# Patient Record
Sex: MALE | Race: WHITE | NOT HISPANIC OR LATINO | URBAN - METROPOLITAN AREA
[De-identification: names, ages, dates, MRNs, and addresses within clinical notes are randomized per-mention and may not be internally consistent; named-entity substitution may affect disease eponyms.]

---

## 2021-07-07 ENCOUNTER — OFFICE VISIT (OUTPATIENT)
Dept: RHEUMATOLOGY | Facility: CLINIC | Age: 66
End: 2021-07-07
Payer: COMMERCIAL

## 2021-07-07 VITALS
HEART RATE: 85 BPM | RESPIRATION RATE: 16 BRPM | SYSTOLIC BLOOD PRESSURE: 178 MMHG | OXYGEN SATURATION: 98 % | HEIGHT: 68 IN | BODY MASS INDEX: 24.25 KG/M2 | WEIGHT: 160 LBS | DIASTOLIC BLOOD PRESSURE: 88 MMHG | TEMPERATURE: 98 F

## 2021-07-07 DIAGNOSIS — R06.09 DYSPNEA ON EXERTION: ICD-10-CM

## 2021-07-07 DIAGNOSIS — J84.9 INTERSTITIAL PULMONARY DISEASE, UNSPECIFIED: ICD-10-CM

## 2021-07-07 DIAGNOSIS — Z51.81 MEDICATION MONITORING ENCOUNTER: ICD-10-CM

## 2021-07-07 DIAGNOSIS — J84.9 INTERSTITIAL LUNG DISEASE: ICD-10-CM

## 2021-07-07 DIAGNOSIS — M15.8 OTHER OSTEOARTHRITIS INVOLVING MULTIPLE JOINTS: ICD-10-CM

## 2021-07-07 DIAGNOSIS — Z13.89 SCREENING FOR RHEUMATIC DISORDER: ICD-10-CM

## 2021-07-07 DIAGNOSIS — M51.36 LUMBAR DEGENERATIVE DISC DISEASE: ICD-10-CM

## 2021-07-07 DIAGNOSIS — Z11.1 SCREENING-PULMONARY TB: ICD-10-CM

## 2021-07-07 DIAGNOSIS — M05.79 RHEUMATOID ARTHRITIS INVOLVING MULTIPLE SITES WITH POSITIVE RHEUMATOID FACTOR: Primary | ICD-10-CM

## 2021-07-07 DIAGNOSIS — Z11.59 ENCOUNTER FOR HEPATITIS C SCREENING TEST FOR LOW RISK PATIENT: ICD-10-CM

## 2021-07-07 PROCEDURE — 3288F FALL RISK ASSESSMENT DOCD: CPT | Mod: CPTII,S$GLB,, | Performed by: INTERNAL MEDICINE

## 2021-07-07 PROCEDURE — 3288F PR FALLS RISK ASSESSMENT DOCUMENTED: ICD-10-PCS | Mod: CPTII,S$GLB,, | Performed by: INTERNAL MEDICINE

## 2021-07-07 PROCEDURE — 3008F PR BODY MASS INDEX (BMI) DOCUMENTED: ICD-10-PCS | Mod: CPTII,S$GLB,, | Performed by: INTERNAL MEDICINE

## 2021-07-07 PROCEDURE — 3008F BODY MASS INDEX DOCD: CPT | Mod: CPTII,S$GLB,, | Performed by: INTERNAL MEDICINE

## 2021-07-07 PROCEDURE — 1101F PT FALLS ASSESS-DOCD LE1/YR: CPT | Mod: CPTII,S$GLB,, | Performed by: INTERNAL MEDICINE

## 2021-07-07 PROCEDURE — 99205 OFFICE O/P NEW HI 60 MIN: CPT | Mod: S$GLB,,, | Performed by: INTERNAL MEDICINE

## 2021-07-07 PROCEDURE — 1101F PR PT FALLS ASSESS DOC 0-1 FALLS W/OUT INJ PAST YR: ICD-10-PCS | Mod: CPTII,S$GLB,, | Performed by: INTERNAL MEDICINE

## 2021-07-07 PROCEDURE — 1125F AMNT PAIN NOTED PAIN PRSNT: CPT | Mod: S$GLB,,, | Performed by: INTERNAL MEDICINE

## 2021-07-07 PROCEDURE — 1125F PR PAIN SEVERITY QUANTIFIED, PAIN PRESENT: ICD-10-PCS | Mod: S$GLB,,, | Performed by: INTERNAL MEDICINE

## 2021-07-07 PROCEDURE — 99205 PR OFFICE/OUTPT VISIT, NEW, LEVL V, 60-74 MIN: ICD-10-PCS | Mod: S$GLB,,, | Performed by: INTERNAL MEDICINE

## 2021-07-07 RX ORDER — LABETALOL 200 MG/1
TABLET, FILM COATED ORAL
COMMUNITY

## 2021-07-07 RX ORDER — HYDROXYCHLOROQUINE SULFATE 200 MG/1
200 TABLET, FILM COATED ORAL DAILY
Qty: 30 TABLET | Refills: 1 | Status: SHIPPED | OUTPATIENT
Start: 2021-07-07 | End: 2021-08-12 | Stop reason: SDUPTHER

## 2021-07-07 RX ORDER — MELOXICAM 15 MG/1
15 TABLET ORAL DAILY PRN
Qty: 30 TABLET | Refills: 1 | Status: SHIPPED | OUTPATIENT
Start: 2021-07-07 | End: 2021-08-12 | Stop reason: SDUPTHER

## 2021-07-07 RX ORDER — ALBUTEROL SULFATE 90 UG/1
AEROSOL, METERED RESPIRATORY (INHALATION)
COMMUNITY

## 2021-07-07 RX ORDER — FLUTICASONE PROPIONATE AND SALMETEROL 250; 50 UG/1; UG/1
POWDER RESPIRATORY (INHALATION)
COMMUNITY

## 2021-07-07 RX ORDER — ASPIRIN 81 MG/1
TABLET ORAL
COMMUNITY

## 2021-07-07 RX ORDER — ATORVASTATIN CALCIUM 80 MG/1
TABLET, FILM COATED ORAL
COMMUNITY

## 2021-07-07 RX ORDER — OMEPRAZOLE 10 MG/1
10 CAPSULE, DELAYED RELEASE ORAL DAILY
COMMUNITY

## 2021-08-12 ENCOUNTER — OFFICE VISIT (OUTPATIENT)
Dept: RHEUMATOLOGY | Facility: CLINIC | Age: 66
End: 2021-08-12
Payer: COMMERCIAL

## 2021-08-12 VITALS
SYSTOLIC BLOOD PRESSURE: 230 MMHG | HEART RATE: 72 BPM | HEIGHT: 68 IN | RESPIRATION RATE: 16 BRPM | DIASTOLIC BLOOD PRESSURE: 176 MMHG | TEMPERATURE: 98 F | WEIGHT: 163 LBS | BODY MASS INDEX: 24.71 KG/M2 | OXYGEN SATURATION: 98 %

## 2021-08-12 DIAGNOSIS — M51.36 LUMBAR DEGENERATIVE DISC DISEASE: ICD-10-CM

## 2021-08-12 DIAGNOSIS — Z51.81 MEDICATION MONITORING ENCOUNTER: ICD-10-CM

## 2021-08-12 DIAGNOSIS — M05.79 RHEUMATOID ARTHRITIS INVOLVING MULTIPLE SITES WITH POSITIVE RHEUMATOID FACTOR: Primary | ICD-10-CM

## 2021-08-12 DIAGNOSIS — M15.8 OTHER OSTEOARTHRITIS INVOLVING MULTIPLE JOINTS: ICD-10-CM

## 2021-08-12 DIAGNOSIS — G56.03 BILATERAL CARPAL TUNNEL SYNDROME: ICD-10-CM

## 2021-08-12 DIAGNOSIS — M54.16 LUMBAR RADICULOPATHY: ICD-10-CM

## 2021-08-12 DIAGNOSIS — M47.812 CERVICAL SPONDYLOSIS: ICD-10-CM

## 2021-08-12 DIAGNOSIS — J84.9 INTERSTITIAL LUNG DISEASE: ICD-10-CM

## 2021-08-12 DIAGNOSIS — M54.12 CERVICAL RADICULOPATHY: ICD-10-CM

## 2021-08-12 PROCEDURE — 3008F BODY MASS INDEX DOCD: CPT | Mod: CPTII,S$GLB,, | Performed by: INTERNAL MEDICINE

## 2021-08-12 PROCEDURE — 1159F PR MEDICATION LIST DOCUMENTED IN MEDICAL RECORD: ICD-10-PCS | Mod: CPTII,S$GLB,, | Performed by: INTERNAL MEDICINE

## 2021-08-12 PROCEDURE — 3008F PR BODY MASS INDEX (BMI) DOCUMENTED: ICD-10-PCS | Mod: CPTII,S$GLB,, | Performed by: INTERNAL MEDICINE

## 2021-08-12 PROCEDURE — 3077F SYST BP >= 140 MM HG: CPT | Mod: CPTII,S$GLB,, | Performed by: INTERNAL MEDICINE

## 2021-08-12 PROCEDURE — 99214 OFFICE O/P EST MOD 30 MIN: CPT | Mod: S$GLB,,, | Performed by: INTERNAL MEDICINE

## 2021-08-12 PROCEDURE — 1101F PR PT FALLS ASSESS DOC 0-1 FALLS W/OUT INJ PAST YR: ICD-10-PCS | Mod: CPTII,S$GLB,, | Performed by: INTERNAL MEDICINE

## 2021-08-12 PROCEDURE — 3077F PR MOST RECENT SYSTOLIC BLOOD PRESSURE >= 140 MM HG: ICD-10-PCS | Mod: CPTII,S$GLB,, | Performed by: INTERNAL MEDICINE

## 2021-08-12 PROCEDURE — 1101F PT FALLS ASSESS-DOCD LE1/YR: CPT | Mod: CPTII,S$GLB,, | Performed by: INTERNAL MEDICINE

## 2021-08-12 PROCEDURE — 1125F PR PAIN SEVERITY QUANTIFIED, PAIN PRESENT: ICD-10-PCS | Mod: CPTII,S$GLB,, | Performed by: INTERNAL MEDICINE

## 2021-08-12 PROCEDURE — 1160F PR REVIEW ALL MEDS BY PRESCRIBER/CLIN PHARMACIST DOCUMENTED: ICD-10-PCS | Mod: CPTII,S$GLB,, | Performed by: INTERNAL MEDICINE

## 2021-08-12 PROCEDURE — 99214 PR OFFICE/OUTPT VISIT, EST, LEVL IV, 30-39 MIN: ICD-10-PCS | Mod: S$GLB,,, | Performed by: INTERNAL MEDICINE

## 2021-08-12 PROCEDURE — 1125F AMNT PAIN NOTED PAIN PRSNT: CPT | Mod: CPTII,S$GLB,, | Performed by: INTERNAL MEDICINE

## 2021-08-12 PROCEDURE — 3080F PR MOST RECENT DIASTOLIC BLOOD PRESSURE >= 90 MM HG: ICD-10-PCS | Mod: CPTII,S$GLB,, | Performed by: INTERNAL MEDICINE

## 2021-08-12 PROCEDURE — 3080F DIAST BP >= 90 MM HG: CPT | Mod: CPTII,S$GLB,, | Performed by: INTERNAL MEDICINE

## 2021-08-12 PROCEDURE — 1160F RVW MEDS BY RX/DR IN RCRD: CPT | Mod: CPTII,S$GLB,, | Performed by: INTERNAL MEDICINE

## 2021-08-12 PROCEDURE — 3288F FALL RISK ASSESSMENT DOCD: CPT | Mod: CPTII,S$GLB,, | Performed by: INTERNAL MEDICINE

## 2021-08-12 PROCEDURE — 1159F MED LIST DOCD IN RCRD: CPT | Mod: CPTII,S$GLB,, | Performed by: INTERNAL MEDICINE

## 2021-08-12 PROCEDURE — 3288F PR FALLS RISK ASSESSMENT DOCUMENTED: ICD-10-PCS | Mod: CPTII,S$GLB,, | Performed by: INTERNAL MEDICINE

## 2021-08-12 RX ORDER — MELOXICAM 15 MG/1
15 TABLET ORAL DAILY PRN
Qty: 30 TABLET | Refills: 3 | Status: SHIPPED | OUTPATIENT
Start: 2021-08-12 | End: 2021-11-15 | Stop reason: SDUPTHER

## 2021-08-12 RX ORDER — HYDROXYCHLOROQUINE SULFATE 200 MG/1
200 TABLET, FILM COATED ORAL DAILY
Qty: 30 TABLET | Refills: 3 | Status: SHIPPED | OUTPATIENT
Start: 2021-08-12 | End: 2021-11-15 | Stop reason: SDUPTHER

## 2021-11-15 ENCOUNTER — OFFICE VISIT (OUTPATIENT)
Dept: RHEUMATOLOGY | Facility: CLINIC | Age: 66
End: 2021-11-15
Payer: COMMERCIAL

## 2021-11-15 VITALS
BODY MASS INDEX: 26.22 KG/M2 | HEIGHT: 68 IN | HEART RATE: 83 BPM | OXYGEN SATURATION: 98 % | RESPIRATION RATE: 16 BRPM | SYSTOLIC BLOOD PRESSURE: 187 MMHG | WEIGHT: 173 LBS | DIASTOLIC BLOOD PRESSURE: 86 MMHG

## 2021-11-15 DIAGNOSIS — M05.79 RHEUMATOID ARTHRITIS INVOLVING MULTIPLE SITES WITH POSITIVE RHEUMATOID FACTOR: Primary | ICD-10-CM

## 2021-11-15 DIAGNOSIS — M54.12 CERVICAL RADICULOPATHY: ICD-10-CM

## 2021-11-15 DIAGNOSIS — Z51.81 MEDICATION MONITORING ENCOUNTER: ICD-10-CM

## 2021-11-15 DIAGNOSIS — M54.16 LUMBAR RADICULOPATHY: ICD-10-CM

## 2021-11-15 DIAGNOSIS — M51.36 LUMBAR DEGENERATIVE DISC DISEASE: ICD-10-CM

## 2021-11-15 DIAGNOSIS — J84.9 INTERSTITIAL LUNG DISEASE: ICD-10-CM

## 2021-11-15 DIAGNOSIS — G56.03 BILATERAL CARPAL TUNNEL SYNDROME: ICD-10-CM

## 2021-11-15 DIAGNOSIS — M15.8 OTHER OSTEOARTHRITIS INVOLVING MULTIPLE JOINTS: ICD-10-CM

## 2021-11-15 DIAGNOSIS — M47.812 CERVICAL SPONDYLOSIS: ICD-10-CM

## 2021-11-15 DIAGNOSIS — M60.89 OTHER MYOSITIS OF MULTIPLE SITES: ICD-10-CM

## 2021-11-15 PROCEDURE — 3077F PR MOST RECENT SYSTOLIC BLOOD PRESSURE >= 140 MM HG: ICD-10-PCS | Mod: CPTII,S$GLB,, | Performed by: INTERNAL MEDICINE

## 2021-11-15 PROCEDURE — 1160F RVW MEDS BY RX/DR IN RCRD: CPT | Mod: CPTII,S$GLB,, | Performed by: INTERNAL MEDICINE

## 2021-11-15 PROCEDURE — 3288F FALL RISK ASSESSMENT DOCD: CPT | Mod: CPTII,S$GLB,, | Performed by: INTERNAL MEDICINE

## 2021-11-15 PROCEDURE — 3079F DIAST BP 80-89 MM HG: CPT | Mod: CPTII,S$GLB,, | Performed by: INTERNAL MEDICINE

## 2021-11-15 PROCEDURE — 1159F PR MEDICATION LIST DOCUMENTED IN MEDICAL RECORD: ICD-10-PCS | Mod: CPTII,S$GLB,, | Performed by: INTERNAL MEDICINE

## 2021-11-15 PROCEDURE — 1160F PR REVIEW ALL MEDS BY PRESCRIBER/CLIN PHARMACIST DOCUMENTED: ICD-10-PCS | Mod: CPTII,S$GLB,, | Performed by: INTERNAL MEDICINE

## 2021-11-15 PROCEDURE — 99214 OFFICE O/P EST MOD 30 MIN: CPT | Mod: S$GLB,,, | Performed by: INTERNAL MEDICINE

## 2021-11-15 PROCEDURE — 3288F PR FALLS RISK ASSESSMENT DOCUMENTED: ICD-10-PCS | Mod: CPTII,S$GLB,, | Performed by: INTERNAL MEDICINE

## 2021-11-15 PROCEDURE — 3008F PR BODY MASS INDEX (BMI) DOCUMENTED: ICD-10-PCS | Mod: CPTII,S$GLB,, | Performed by: INTERNAL MEDICINE

## 2021-11-15 PROCEDURE — 3008F BODY MASS INDEX DOCD: CPT | Mod: CPTII,S$GLB,, | Performed by: INTERNAL MEDICINE

## 2021-11-15 PROCEDURE — 99214 PR OFFICE/OUTPT VISIT, EST, LEVL IV, 30-39 MIN: ICD-10-PCS | Mod: S$GLB,,, | Performed by: INTERNAL MEDICINE

## 2021-11-15 PROCEDURE — 3077F SYST BP >= 140 MM HG: CPT | Mod: CPTII,S$GLB,, | Performed by: INTERNAL MEDICINE

## 2021-11-15 PROCEDURE — 3079F PR MOST RECENT DIASTOLIC BLOOD PRESSURE 80-89 MM HG: ICD-10-PCS | Mod: CPTII,S$GLB,, | Performed by: INTERNAL MEDICINE

## 2021-11-15 PROCEDURE — 1101F PT FALLS ASSESS-DOCD LE1/YR: CPT | Mod: CPTII,S$GLB,, | Performed by: INTERNAL MEDICINE

## 2021-11-15 PROCEDURE — 1159F MED LIST DOCD IN RCRD: CPT | Mod: CPTII,S$GLB,, | Performed by: INTERNAL MEDICINE

## 2021-11-15 PROCEDURE — 1101F PR PT FALLS ASSESS DOC 0-1 FALLS W/OUT INJ PAST YR: ICD-10-PCS | Mod: CPTII,S$GLB,, | Performed by: INTERNAL MEDICINE

## 2021-11-15 RX ORDER — HYDROXYCHLOROQUINE SULFATE 200 MG/1
200 TABLET, FILM COATED ORAL DAILY
Qty: 30 TABLET | Refills: 4 | Status: SHIPPED | OUTPATIENT
Start: 2021-11-15 | End: 2022-06-13 | Stop reason: SDUPTHER

## 2021-11-15 RX ORDER — MELOXICAM 15 MG/1
15 TABLET ORAL DAILY PRN
Qty: 30 TABLET | Refills: 4 | Status: SHIPPED | OUTPATIENT
Start: 2021-11-15 | End: 2022-06-13 | Stop reason: SDUPTHER

## 2022-06-13 ENCOUNTER — OFFICE VISIT (OUTPATIENT)
Dept: RHEUMATOLOGY | Facility: CLINIC | Age: 67
End: 2022-06-13
Payer: COMMERCIAL

## 2022-06-13 VITALS
DIASTOLIC BLOOD PRESSURE: 122 MMHG | SYSTOLIC BLOOD PRESSURE: 218 MMHG | HEIGHT: 68 IN | OXYGEN SATURATION: 98 % | HEART RATE: 102 BPM | RESPIRATION RATE: 16 BRPM | WEIGHT: 176 LBS | BODY MASS INDEX: 26.67 KG/M2

## 2022-06-13 DIAGNOSIS — M05.79 RHEUMATOID ARTHRITIS INVOLVING MULTIPLE SITES WITH POSITIVE RHEUMATOID FACTOR: Primary | ICD-10-CM

## 2022-06-13 DIAGNOSIS — M15.8 OTHER OSTEOARTHRITIS INVOLVING MULTIPLE JOINTS: ICD-10-CM

## 2022-06-13 DIAGNOSIS — M51.36 LUMBAR DEGENERATIVE DISC DISEASE: ICD-10-CM

## 2022-06-13 DIAGNOSIS — M54.12 CERVICAL RADICULOPATHY: ICD-10-CM

## 2022-06-13 DIAGNOSIS — Z51.81 MEDICATION MONITORING ENCOUNTER: ICD-10-CM

## 2022-06-13 DIAGNOSIS — Z13.89 SCREENING FOR RHEUMATIC DISORDER: ICD-10-CM

## 2022-06-13 DIAGNOSIS — M60.89 OTHER MYOSITIS OF MULTIPLE SITES: ICD-10-CM

## 2022-06-13 DIAGNOSIS — J84.9 INTERSTITIAL LUNG DISEASE: ICD-10-CM

## 2022-06-13 DIAGNOSIS — M54.16 LUMBAR RADICULOPATHY: ICD-10-CM

## 2022-06-13 DIAGNOSIS — G56.03 BILATERAL CARPAL TUNNEL SYNDROME: ICD-10-CM

## 2022-06-13 DIAGNOSIS — M47.812 CERVICAL SPONDYLOSIS: ICD-10-CM

## 2022-06-13 DIAGNOSIS — I10 PRIMARY HYPERTENSION: ICD-10-CM

## 2022-06-13 PROCEDURE — 1125F PR PAIN SEVERITY QUANTIFIED, PAIN PRESENT: ICD-10-PCS | Mod: CPTII,S$GLB,, | Performed by: INTERNAL MEDICINE

## 2022-06-13 PROCEDURE — 3080F PR MOST RECENT DIASTOLIC BLOOD PRESSURE >= 90 MM HG: ICD-10-PCS | Mod: CPTII,S$GLB,, | Performed by: INTERNAL MEDICINE

## 2022-06-13 PROCEDURE — 3077F PR MOST RECENT SYSTOLIC BLOOD PRESSURE >= 140 MM HG: ICD-10-PCS | Mod: CPTII,S$GLB,, | Performed by: INTERNAL MEDICINE

## 2022-06-13 PROCEDURE — 1101F PR PT FALLS ASSESS DOC 0-1 FALLS W/OUT INJ PAST YR: ICD-10-PCS | Mod: CPTII,S$GLB,, | Performed by: INTERNAL MEDICINE

## 2022-06-13 PROCEDURE — 3288F FALL RISK ASSESSMENT DOCD: CPT | Mod: CPTII,S$GLB,, | Performed by: INTERNAL MEDICINE

## 2022-06-13 PROCEDURE — 1125F AMNT PAIN NOTED PAIN PRSNT: CPT | Mod: CPTII,S$GLB,, | Performed by: INTERNAL MEDICINE

## 2022-06-13 PROCEDURE — 3008F PR BODY MASS INDEX (BMI) DOCUMENTED: ICD-10-PCS | Mod: CPTII,S$GLB,, | Performed by: INTERNAL MEDICINE

## 2022-06-13 PROCEDURE — 99214 PR OFFICE/OUTPT VISIT, EST, LEVL IV, 30-39 MIN: ICD-10-PCS | Mod: S$GLB,,, | Performed by: INTERNAL MEDICINE

## 2022-06-13 PROCEDURE — 3077F SYST BP >= 140 MM HG: CPT | Mod: CPTII,S$GLB,, | Performed by: INTERNAL MEDICINE

## 2022-06-13 PROCEDURE — 3008F BODY MASS INDEX DOCD: CPT | Mod: CPTII,S$GLB,, | Performed by: INTERNAL MEDICINE

## 2022-06-13 PROCEDURE — 1101F PT FALLS ASSESS-DOCD LE1/YR: CPT | Mod: CPTII,S$GLB,, | Performed by: INTERNAL MEDICINE

## 2022-06-13 PROCEDURE — 1160F PR REVIEW ALL MEDS BY PRESCRIBER/CLIN PHARMACIST DOCUMENTED: ICD-10-PCS | Mod: CPTII,S$GLB,, | Performed by: INTERNAL MEDICINE

## 2022-06-13 PROCEDURE — 1159F PR MEDICATION LIST DOCUMENTED IN MEDICAL RECORD: ICD-10-PCS | Mod: CPTII,S$GLB,, | Performed by: INTERNAL MEDICINE

## 2022-06-13 PROCEDURE — 99214 OFFICE O/P EST MOD 30 MIN: CPT | Mod: S$GLB,,, | Performed by: INTERNAL MEDICINE

## 2022-06-13 PROCEDURE — 1159F MED LIST DOCD IN RCRD: CPT | Mod: CPTII,S$GLB,, | Performed by: INTERNAL MEDICINE

## 2022-06-13 PROCEDURE — 3288F PR FALLS RISK ASSESSMENT DOCUMENTED: ICD-10-PCS | Mod: CPTII,S$GLB,, | Performed by: INTERNAL MEDICINE

## 2022-06-13 PROCEDURE — 3080F DIAST BP >= 90 MM HG: CPT | Mod: CPTII,S$GLB,, | Performed by: INTERNAL MEDICINE

## 2022-06-13 PROCEDURE — 1160F RVW MEDS BY RX/DR IN RCRD: CPT | Mod: CPTII,S$GLB,, | Performed by: INTERNAL MEDICINE

## 2022-06-13 RX ORDER — HYDROXYCHLOROQUINE SULFATE 200 MG/1
200 TABLET, FILM COATED ORAL DAILY
Qty: 30 TABLET | Refills: 5 | Status: SHIPPED | OUTPATIENT
Start: 2022-06-13

## 2022-06-13 RX ORDER — MELOXICAM 15 MG/1
15 TABLET ORAL DAILY PRN
Qty: 30 TABLET | Refills: 4 | Status: SHIPPED | OUTPATIENT
Start: 2022-06-13

## 2022-06-13 RX ORDER — HYDROCHLOROTHIAZIDE 12.5 MG/1
12.5 TABLET ORAL DAILY
Qty: 30 TABLET | Refills: 4 | Status: SHIPPED | OUTPATIENT
Start: 2022-06-13

## 2022-06-13 NOTE — PROGRESS NOTES
Subjective:      Patient ID: Selwyn PATEL is a 67 y.o. male.    Chief Complaint: Disease Management    HPI:   Includes joint pain without subjective swelling.   Antecedent event includes: None;  Pain location includes: joints;  Gradual onset; beginning >years ago;  Intermittent ache, Mild in severity,   Lasting >seconds, Worse during the daytime;   Improved with rest and medication;   Worsened with activity and overuse;     Review of Systems   Constitutional: Positive for fatigue. Negative for chills and fever.   HENT: Negative for nasal congestion, ear pain, hearing loss, mouth dryness, mouth sores, nosebleeds and trouble swallowing.    Eyes: Negative for photophobia, pain, redness, visual disturbance and eye dryness.   Respiratory: Negative for cough and shortness of breath.    Cardiovascular: Negative for chest pain, palpitations and leg swelling.   Gastrointestinal: Negative for abdominal distention, abdominal pain, blood in stool, constipation, diarrhea, rectal pain, vomiting and fecal incontinence.   Endocrine: Negative for polydipsia and polyuria.   Genitourinary: Negative for bladder incontinence, dysuria, enuresis, genital sores and hematuria.   Musculoskeletal: Positive for arthralgias and back pain. Negative for joint swelling and myalgias.   Integumentary:  Negative for rash, wound and mole/lesion.   Neurological: Negative for weakness and headaches.   Hematological: Negative for adenopathy. Does not bruise/bleed easily.   Psychiatric/Behavioral: Negative for dysphoric mood and sleep disturbance. The patient is not nervous/anxious.       Past Medical History:   Diagnosis Date    COPD (chronic obstructive pulmonary disease)     Hyperlipidemia     Hypertension      Past Surgical History:   Procedure Laterality Date    back fusion      BRAIN SURGERY      CHOLECYSTECTOMY      rotaor cuff      SKIN CANCER DESTRUCTION        See the Assessment/Plan for further characterization of the HPI, ROS,  "Medical, Surgical, Family, and Social Histories including Tobacco use, Meds; all of which has been reviewed in Epic.    Medication List with Changes/Refills   New Medications    HYDROCHLOROTHIAZIDE (HYDRODIURIL) 12.5 MG TAB    Take 1 tablet (12.5 mg total) by mouth once daily.   Current Medications    ALBUTEROL (PROVENTIL/VENTOLIN HFA) 90 MCG/ACTUATION INHALER    Ventolin HFA 90 mcg/actuation aerosol inhaler   Inhale 2 puffs every 4 hours by inhalation route.    ASPIRIN (ECOTRIN) 81 MG EC TABLET    Aspir-81 mg tablet,delayed release   Take 1 tablet every day by oral route.    ATORVASTATIN (LIPITOR) 80 MG TABLET    atorvastatin 80 mg tablet   TAKE 1 TABLET BY MOUTH ONCE DAILY AT BEDTIME    FLUTICASONE-SALMETEROL DISKUS INHALER 250-50 MCG    Advair Diskus 250 mcg-50 mcg/dose powder for inhalation   Inhale 1 puff twice a day by inhalation route.    LABETALOL (NORMODYNE) 200 MG TABLET    labetalol 200 mg tablet   TAKE 1 TABLET BY MOUTH TWICE DAILY    OMEPRAZOLE (PRILOSEC) 10 MG CAPSULE    Take 10 mg by mouth once daily.   Changed and/or Refilled Medications    Modified Medication Previous Medication    HYDROXYCHLOROQUINE (PLAQUENIL) 200 MG TABLET hydrOXYchloroQUINE (PLAQUENIL) 200 mg tablet       Take 1 tablet (200 mg total) by mouth once daily.    Take 1 tablet (200 mg total) by mouth once daily.    MELOXICAM (MOBIC) 15 MG TABLET meloxicam (MOBIC) 15 MG tablet       Take 1 tablet (15 mg total) by mouth daily as needed for Pain.    Take 1 tablet (15 mg total) by mouth daily as needed for Pain.         Objective:   BP (!) 218/122   Pulse 102   Resp 16   Ht 5' 8" (1.727 m)   Wt 79.8 kg (176 lb)   SpO2 98%   BMI 26.76 kg/m²   Physical Exam  Non-toxic appearance. No distress.   Normocephalic and atraumatic.   External ears normal.    Conjunctivae and EOM are normal. No scleral icterus.   RRR, No friction rub; palpable distal pulses.    No tachypnea or signs of respiratory distress.   Abdominal: No guarding or rebound " tenderness.   Neurological: Oriented. Normal thought content. No cranial nerve deficit. Strength is grossly normal without focal deficit.  Skin is warm. No pallor.   Musculoskeletal: DJD. see below for further input. No overt synovitis.     No image results found.    No visits with results within 1 Year(s) from this visit.   Latest known visit with results is:   No results found for any previous visit.        All of the data above and below has been reviewed by myself and any further interpretations will be reflected in the assessment and plan.   The data includes review of external notes, and independent interpretation of lab results, x-rays, and imaging reports.  Active inflammatory arthritis is an illness that poses a threat to bodily function and even a threat to life in some cases.  Drug therapy to treat inflammatory arthritis usually requires intensive monitoring for toxicity.    Review of patient's allergies indicates:  No Known Allergies  Assessment/Plan:       Visit prior to Last visit:     Interim lab 7/9/21:     SPEP normal     C4 26  C3 127     2021 Tb negative     SHIV neg  RF96+ CCP not done HepBsAgsAb neg HepBcoreAb neg HCV neg     Interim 7/28/21 HRCT: moderate prominent interlobular and intralobular interstitial thickening worse in lower lobes centrally bilateral symmetric with areas of subpleural ground glass more significant posteriorly; slightly more prominent than prior exam 2019      Interim PFTs: % FEV1 107%; FEV/%; DLCO 70%     EMG/NCS 5/17/16: chronic left L4 radiculopathy  EMG/NCS 3/4/15: mild chronic right C5 radiculopathy; mild moderate carpal tunnel syndrome     Dr. Arin Manriquez     plaquenil 200mg daily  mobic 15mg daily prn     He has some interim pain in spine and intermittent pain, but feels very good mostly pain free other than back that is worse from the long drive down here     We are seeking the rest of his lab not sent     GFR 97; alb 3.5  Uric acid  5.4  ESR 45; CRP<0.2     He did have interim left olecranon swelling and had aspirated and steroid injection     No overt synovitis but some suggestion of mild active disease; I am leery of Methotrexate given the lung disease etc.     Consider Add sulfasalazine 500mg daily but he clarifies he is doing well and will plan to call if symptoms recur and   we will hold off adding for now     I also suggest Pulmonary evaluation for the ILD for optimization given his chronic cough, but he tells me rather cough has improved and     He quit smoking weeks ago and quit drinking     He uses medical marijuana and does not notice much during the day but his sleep has markedly improved noted     Contact us prn     Revisit 3 months with lab 2 weeks prior     Last visit:     Interim lab 11/11/21:     WBC 7.4; Hgb 13.7; plt 260     Creat 0.9; alb 3.3     CPK 1354     ESR 47 CRP 8.2     plaquenil 200mg daily  mobic 15mg daily prn     He has some interim symptoms of inflammatory arthritis and also some back pain     CPK noted     Consider adding Enbrel or leflunomide     He rather clarifies he has been doing well  He shows me some pictures of he has been hanging [place] lights and may be part of reason for the CPK  His son and grandson do Introhive work and other     He quit drinking and smoking months back  He has modified his diet which is great     No overt synovitis     He would rather not add medicine and he appears to be doing well     Stay well hydrated     He could increase the plaquenil to bid if needed in future, but he will plan to call if problem or question     Revisit 4 months with lab 2 weeks prior     Contact us prn    This visit:      Interim lab 6/10/22:    UA SG 1.025 3-4 WBC 1-2 epi cells; no bacteria; mucus    WBC 6.7; hgb 14.4; [id number]    CPK 2074 prior CPK 1354    Creat 1; alb 3.7    Aldolase pending    ESR 30; CRP<0.2      Prev noted:   Consider adding Enbrel or leflunomide  He rather clarifies he has been  doing well  plaquenil 200mg daily  mobic 15mg daily prn  He could increase the plaquenil to bid if needed in future, but he will plan to call if problem or question      We had planned to see him sooner; He had missed?    He has been doing well mostly pain free other than his low back exacerbated by the long car drive    His BP is high; Is he using his HTN meds and workign with PCP?    He has not seen his PCP in some time but he checks his BP regularly and has not been high with him    He will also plan to update PCP    No overt synovitis but some DJD  Lungs sound a little better    Went over lab CPK 2074    Consider backing off of the lipitor with prescribing MD PCP as may be culprit of asymptomatic CPK    Add HCTZ 12.5mg daily if BP remains elevated and contact PCP; go to ED if symptoms    Cont plaquenil 200mg daily    Cont mobic 15mg daily prn    We wants his marijuana prescription sent to [facility] which I wrote Tati to send      Revisit 4 months with lab 2 weeks prior    Contact us prn     RF+ CCP+ SHIV neg  RF96+ HepBsAgsAb neg HepBcoreAb neg HCV neg uric acid 5.4  Rheumatoid arthritis by history     2021 Tb negative     Mostly in remission on exam but some recent steroid including left elbow injection weeks ago     Mild deformities including left 2,3 MCPs  Diminished breath sounds prolonged expiration  Crackles     His ILD complicates his treatment     Prev noted/prior plan:   (He smokes marijuana occasionally when he can get it helps his pain and sleep     We will refer to Medical marijuana clinic for oral     Verbal education and written     Lab to further evaluate prior to next visit     HRCT and PFT's to further evaluate the ILD COPD     Add plaquenil 200mg daily     Resume mobic 15mg daily prn     Revisit 5 weeks)      Interstitial lung disease on recent CXR 6/2/21: mild chronic interstitial appearing fibrosis otherwise normal     He should see Pulmonary if not already; he thinks he  is to be scheduled     There is also some mechanical and neurogenic pain.     Lumbar DJD DDD     Prior SHERICE Dr. Manriquez     Chronic numbness left thigh to possibly have further surgery with Dr. Hinkle     EMG/NCS 5/17/16: chronic left L4 radiculopathy  EMG/NCS 3/4/15: mild chronic right C5 radiculopathy; mild moderate carpal tunnel syndrome     S/p lumbar surgery     S/p shoulder surgery per records     Has been managing with:     Prior recent prednisone     Prior NSAIDs     Prior Lyrica 50mg tid with Dr. Hinkle he did not recall     Has been working with:     Dr. Hinkle Neurosurgery     S/p brain surgeries per records     COPD Emphysema     Records PCPJemarito Will APRN FNP some of note 6/2/21: HPI elev Rheumatoid factor; did not get referral to rheumatology October 2020; he quit taking prednisone I gave him last visit; he has lost at least 20 lbs since last visit;   HLD, HTN, COPD refill advair, Rheumatoid arthritis, weight loss     CXR 6/2/21: mild chronic interstitial appearing fibrosis otherwise normal     Records 4/9/19 CT lumbar: DJD DDD with Postoperative changes;   4/9/21 MRI lumbar: prior posterior fusion L4-S1; DJD DDD most severe L3-4 L4-5 L5-S1;     Records Dr. Hinkle 2/12/18: s/p TLIF L4-5-S1 in 2015 with worsening pain; will review imaging and evaluate need for surgery; revisit 2-4 weeks; lyrica 50mg tid     Review of medical records as stated above.  Lab +/- imaging and other ordered diagnostic studies to further evaluate.  See the orders associated with this note visit.  Medications as prescribed as tolerated.    Education including verbal and those noted in the patient instructions.  Revisit as scheduled and call prn.    The following diagnoses influence medical decision making and/or need further workup including the orders listed below:    Rheumatoid arthritis involving multiple sites with positive rheumatoid factor  -     SHIV by IFA, w/Rflx; Future; Expected date: 09/26/2022  -      Comprehensive Metabolic Panel; Future; Expected date: 09/26/2022  -     C-Reactive Protein; Future; Expected date: 09/26/2022  -     C3 Complement; Future; Expected date: 09/26/2022  -     C4 Complement; Future; Expected date: 09/26/2022  -     CBC Auto Differential; Future; Expected date: 09/26/2022  -     Urinalysis Microscopic; Future; Expected date: 09/26/2022  -     Sedimentation rate; Future; Expected date: 09/26/2022  -     CK; Future; Expected date: 09/26/2022  -     Aldolase; Future; Expected date: 09/26/2022  -     meloxicam (MOBIC) 15 MG tablet; Take 1 tablet (15 mg total) by mouth daily as needed for Pain.  Dispense: 30 tablet; Refill: 4  -     hydrOXYchloroQUINE (PLAQUENIL) 200 mg tablet; Take 1 tablet (200 mg total) by mouth once daily.  Dispense: 30 tablet; Refill: 5    Interstitial lung disease  -     SHIV by IFA, w/Rflx; Future; Expected date: 09/26/2022  -     Comprehensive Metabolic Panel; Future; Expected date: 09/26/2022  -     C-Reactive Protein; Future; Expected date: 09/26/2022  -     C3 Complement; Future; Expected date: 09/26/2022  -     C4 Complement; Future; Expected date: 09/26/2022  -     CBC Auto Differential; Future; Expected date: 09/26/2022  -     Urinalysis Microscopic; Future; Expected date: 09/26/2022  -     Sedimentation rate; Future; Expected date: 09/26/2022  -     CK; Future; Expected date: 09/26/2022  -     Aldolase; Future; Expected date: 09/26/2022    Other osteoarthritis involving multiple joints  -     SHIV by IFA, w/Rflx; Future; Expected date: 09/26/2022  -     Comprehensive Metabolic Panel; Future; Expected date: 09/26/2022  -     C-Reactive Protein; Future; Expected date: 09/26/2022  -     C3 Complement; Future; Expected date: 09/26/2022  -     C4 Complement; Future; Expected date: 09/26/2022  -     CBC Auto Differential; Future; Expected date: 09/26/2022  -     Urinalysis Microscopic; Future; Expected date: 09/26/2022  -     Sedimentation rate; Future; Expected date:  09/26/2022  -     CK; Future; Expected date: 09/26/2022  -     Aldolase; Future; Expected date: 09/26/2022  -     meloxicam (MOBIC) 15 MG tablet; Take 1 tablet (15 mg total) by mouth daily as needed for Pain.  Dispense: 30 tablet; Refill: 4    Lumbar degenerative disc disease  -     SHIV by IFA, w/Rflx; Future; Expected date: 09/26/2022  -     Comprehensive Metabolic Panel; Future; Expected date: 09/26/2022  -     C-Reactive Protein; Future; Expected date: 09/26/2022  -     C3 Complement; Future; Expected date: 09/26/2022  -     C4 Complement; Future; Expected date: 09/26/2022  -     CBC Auto Differential; Future; Expected date: 09/26/2022  -     Urinalysis Microscopic; Future; Expected date: 09/26/2022  -     Sedimentation rate; Future; Expected date: 09/26/2022  -     CK; Future; Expected date: 09/26/2022  -     Aldolase; Future; Expected date: 09/26/2022  -     meloxicam (MOBIC) 15 MG tablet; Take 1 tablet (15 mg total) by mouth daily as needed for Pain.  Dispense: 30 tablet; Refill: 4    Cervical spondylosis  -     SHIV by IFA, w/Rflx; Future; Expected date: 09/26/2022  -     Comprehensive Metabolic Panel; Future; Expected date: 09/26/2022  -     C-Reactive Protein; Future; Expected date: 09/26/2022  -     C3 Complement; Future; Expected date: 09/26/2022  -     C4 Complement; Future; Expected date: 09/26/2022  -     CBC Auto Differential; Future; Expected date: 09/26/2022  -     Urinalysis Microscopic; Future; Expected date: 09/26/2022  -     Sedimentation rate; Future; Expected date: 09/26/2022  -     CK; Future; Expected date: 09/26/2022  -     Aldolase; Future; Expected date: 09/26/2022  -     meloxicam (MOBIC) 15 MG tablet; Take 1 tablet (15 mg total) by mouth daily as needed for Pain.  Dispense: 30 tablet; Refill: 4    Other myositis of multiple sites  -     SHIV by IFA, w/Rflx; Future; Expected date: 09/26/2022  -     Comprehensive Metabolic Panel; Future; Expected date: 09/26/2022  -     C-Reactive Protein;  Future; Expected date: 09/26/2022  -     C3 Complement; Future; Expected date: 09/26/2022  -     C4 Complement; Future; Expected date: 09/26/2022  -     CBC Auto Differential; Future; Expected date: 09/26/2022  -     Urinalysis Microscopic; Future; Expected date: 09/26/2022  -     Sedimentation rate; Future; Expected date: 09/26/2022  -     CK; Future; Expected date: 09/26/2022  -     Aldolase; Future; Expected date: 09/26/2022    Bilateral carpal tunnel syndrome  -     SHIV by IFA, w/Rflx; Future; Expected date: 09/26/2022  -     Comprehensive Metabolic Panel; Future; Expected date: 09/26/2022  -     C-Reactive Protein; Future; Expected date: 09/26/2022  -     C3 Complement; Future; Expected date: 09/26/2022  -     C4 Complement; Future; Expected date: 09/26/2022  -     CBC Auto Differential; Future; Expected date: 09/26/2022  -     Urinalysis Microscopic; Future; Expected date: 09/26/2022  -     Sedimentation rate; Future; Expected date: 09/26/2022  -     CK; Future; Expected date: 09/26/2022  -     Aldolase; Future; Expected date: 09/26/2022  -     meloxicam (MOBIC) 15 MG tablet; Take 1 tablet (15 mg total) by mouth daily as needed for Pain.  Dispense: 30 tablet; Refill: 4    Cervical radiculopathy  -     SHIV by IFA, w/Rflx; Future; Expected date: 09/26/2022  -     Comprehensive Metabolic Panel; Future; Expected date: 09/26/2022  -     C-Reactive Protein; Future; Expected date: 09/26/2022  -     C3 Complement; Future; Expected date: 09/26/2022  -     C4 Complement; Future; Expected date: 09/26/2022  -     CBC Auto Differential; Future; Expected date: 09/26/2022  -     Urinalysis Microscopic; Future; Expected date: 09/26/2022  -     Sedimentation rate; Future; Expected date: 09/26/2022  -     CK; Future; Expected date: 09/26/2022  -     Aldolase; Future; Expected date: 09/26/2022  -     meloxicam (MOBIC) 15 MG tablet; Take 1 tablet (15 mg total) by mouth daily as needed for Pain.  Dispense: 30 tablet; Refill:  4    Primary hypertension  -     hydroCHLOROthiazide (HYDRODIURIL) 12.5 MG Tab; Take 1 tablet (12.5 mg total) by mouth once daily.  Dispense: 30 tablet; Refill: 4    Lumbar radiculopathy  -     SHIV by Jose DELUNA; Future; Expected date: 09/26/2022  -     Comprehensive Metabolic Panel; Future; Expected date: 09/26/2022  -     C-Reactive Protein; Future; Expected date: 09/26/2022  -     C3 Complement; Future; Expected date: 09/26/2022  -     C4 Complement; Future; Expected date: 09/26/2022  -     CBC Auto Differential; Future; Expected date: 09/26/2022  -     Urinalysis Microscopic; Future; Expected date: 09/26/2022  -     Sedimentation rate; Future; Expected date: 09/26/2022  -     CK; Future; Expected date: 09/26/2022  -     Aldolase; Future; Expected date: 09/26/2022  -     meloxicam (MOBIC) 15 MG tablet; Take 1 tablet (15 mg total) by mouth daily as needed for Pain.  Dispense: 30 tablet; Refill: 4    Medication monitoring encounter  -     SHIV by Jose DELUNA; Future; Expected date: 09/26/2022  -     Comprehensive Metabolic Panel; Future; Expected date: 09/26/2022  -     C-Reactive Protein; Future; Expected date: 09/26/2022  -     C3 Complement; Future; Expected date: 09/26/2022  -     C4 Complement; Future; Expected date: 09/26/2022  -     CBC Auto Differential; Future; Expected date: 09/26/2022  -     Urinalysis Microscopic; Future; Expected date: 09/26/2022  -     Sedimentation rate; Future; Expected date: 09/26/2022  -     CK; Future; Expected date: 09/26/2022  -     Aldolase; Future; Expected date: 09/26/2022    Screening for rheumatic disorder  -     SHIV by Jose DELUNA; Future; Expected date: 09/26/2022  -     Comprehensive Metabolic Panel; Future; Expected date: 09/26/2022  -     C-Reactive Protein; Future; Expected date: 09/26/2022  -     C3 Complement; Future; Expected date: 09/26/2022  -     C4 Complement; Future; Expected date: 09/26/2022  -     CBC Auto Differential; Future; Expected date: 09/26/2022  -      Urinalysis Microscopic; Future; Expected date: 09/26/2022  -     Sedimentation rate; Future; Expected date: 09/26/2022  -     CK; Future; Expected date: 09/26/2022  -     Aldolase; Future; Expected date: 09/26/2022      Follow up in about 4 months (around 10/13/2022).     Edwin Bella MD